# Patient Record
Sex: MALE | Race: WHITE | Employment: OTHER | URBAN - METROPOLITAN AREA
[De-identification: names, ages, dates, MRNs, and addresses within clinical notes are randomized per-mention and may not be internally consistent; named-entity substitution may affect disease eponyms.]

---

## 2017-03-22 ENCOUNTER — OFFICE VISIT (OUTPATIENT)
Dept: URGENT CARE | Facility: URGENT CARE | Age: 42
End: 2017-03-22

## 2017-03-22 VITALS
BODY MASS INDEX: 34.22 KG/M2 | WEIGHT: 301.8 LBS | RESPIRATION RATE: 16 BRPM | OXYGEN SATURATION: 100 % | HEART RATE: 84 BPM | DIASTOLIC BLOOD PRESSURE: 80 MMHG | TEMPERATURE: 98.6 F | SYSTOLIC BLOOD PRESSURE: 128 MMHG

## 2017-03-22 DIAGNOSIS — R07.0 THROAT PAIN: ICD-10-CM

## 2017-03-22 DIAGNOSIS — J02.0 STREPTOCOCCAL PHARYNGITIS: Primary | ICD-10-CM

## 2017-03-22 LAB
DEPRECATED S PYO AG THROAT QL EIA: ABNORMAL
MICRO REPORT STATUS: ABNORMAL
SPECIMEN SOURCE: ABNORMAL

## 2017-03-22 PROCEDURE — 99202 OFFICE O/P NEW SF 15 MIN: CPT | Performed by: NURSE PRACTITIONER

## 2017-03-22 PROCEDURE — 87880 STREP A ASSAY W/OPTIC: CPT | Performed by: NURSE PRACTITIONER

## 2017-03-22 RX ORDER — PENICILLIN V POTASSIUM 500 MG/1
500 TABLET, FILM COATED ORAL 2 TIMES DAILY
Qty: 20 TABLET | Refills: 0 | Status: SHIPPED | OUTPATIENT
Start: 2017-03-22 | End: 2017-04-01

## 2017-03-22 NOTE — MR AVS SNAPSHOT
After Visit Summary   3/22/2017    Saroj Spann    MRN: [id number]           Patient Information     Date Of Birth          1975        Visit Information        Provider Department      3/22/2017 7:30 PM Luis Arnold APRN [facility]RG[facility]        Today's Diagnoses     Streptococcal pharyngitis    -  1    Throat pain          Care Instructions      Pharyngitis: Strep (Confirmed)     You have had a positive test for strep throat. Strep throat is a contagious illness. It is spread by coughing, kissing or by touching others after touching your mouth or nose. Symptoms include throat pain which is worse with swallowing, aching all over, headache and fever. It is treated with antibiotic medication. This should help you start to feel better within 1-2 days.  Home care    Rest at home. Drink plenty of fluids to avoid dehydration.    No work or school for the first 2 days of taking the antibiotics. After this time, you will not be contagious. You can then return to school or work if you are feeling better.     The antibiotic medication must be taken for the full 10 days, even if you feel better. This is very important to ensure the infection is treated. It is also important to prevent drug-resistent organisms from developing. If you were given an antibiotic shot, no more antibiotics are needed.    You may use acetaminophen (Tylenol) or ibuprofen (Motrin, Advil) to control pain or fever, unless another medicine was prescribed for this. (NOTE: If you have chronic liver or kidney disease or ever had a stomach ulcer or GI bleeding, talk with your doctor before using these medicines.)    Throat lozenges or sprays (such as Chloraseptic) help reduce pain. Gargling with warm salt water will also reduce throat pain. Dissolve 1/2 teaspoon of salt in 1 glass of warm water. This may be useful just before meals.     Soft foods are okay. Avoid salty or spicy foods.  Follow-up care  Follow  up with your healthcare provider or our staff if you are not improving over the next week.  When to seek medical advice  Call your healthcare provider right away if any of these occur:    Fever as directed by your doctor     New or worsening ear pain, sinus pain, or headache    Painful lumps in the back of neck    Stiff neck    Lymph nodes are getting larger or becoming soft in the middle    Inability to swallow liquids, excessive drooling, or inability to open mouth wide due to throat pain    Signs of dehydration (very dark urine or no urine, sunken eyes, dizziness)    Trouble breathing or noisy breathing    Muffled voice    New rash    [id number] [employer]. [address] [place] [address] 39759. All rights reserved. This information is not intended as a substitute for professional medical care. Always follow your healthcare professional's instructions.              Follow-ups after your visit        Who to contact     If you have questions or need follow up information about today's clinic visit or your schedule please contact [facility] directly at [phone].  Normal or non-critical lab and imaging results will be communicated to you by MainOnehart, letter or phone within 4 business days after the clinic has received the results. If you do not hear from us within 7 days, please contact the clinic through CrossReadert or phone. If you have a critical or abnormal lab result, we will notify you by phone as soon as possible.  Submit refill requests through Impraise or call your pharmacy and they will forward the refill request to us. Please allow 3 business days for your refill to be completed.          Additional Information About Your Visit        Impraise Information     Impraise gives you secure access to your electronic health record. If you see a primary care provider, you can also send messages to your care team and make appointments. If you have questions, please call your  primary care clinic.  If you do not have a primary care provider, please call [phone] and they will assist you.        Care EveryWhere ID     This is your Care EveryWhere ID. This could be used by other organizations to access your [facility] medical records  [id number]        Your Vitals Were     Pulse Temperature Respirations Pulse Oximetry BMI (Body Mass Index)       84 98.6  F (37  C) (Oral) 16 100% 34.22 kg/m2        Blood Pressure from Last 3 Encounters:   03/22/17 128/80   11/15/12 114/73   10/11/12 116/68    Weight from Last 3 Encounters:   03/22/17 (!) 301 lb 12.8 oz (136.9 kg)   11/15/12 272 lb (123.4 kg)   10/11/12 280 lb (127 kg)              We Performed the Following     Strep, Rapid Screen          Today's Medication Changes          These changes are accurate as of: 3/22/17  8:41 PM.  If you have any questions, ask your nurse or doctor.               Start taking these medicines.        Dose/Directions    penicillin V potassium 500 MG tablet   Commonly known as:  VEETID   Used for:  Streptococcal pharyngitis   Started by:  Luis Arnold APRN CNP        Dose:  500 mg   Take 1 tablet (500 mg) by mouth 2 times daily for 10 days   Quantity:  20 tablet   Refills:  0            Where to get your medicines      These medications were sent to [facility] [address] [place] [address] AT [address] [address]    Hours:  24-hours Phone:  [phone]     penicillin V potassium 500 MG tablet                Primary Care Provider Office Phone # Fax #    Rigoberto Chambers [phone]       [facility] [address] [place] [address]        Thank you!     Thank you for choosing [facility]  for your care. Our goal is always to provide you with excellent care. Hearing back from our patients is one way we can continue to improve our services. Please take a few  minutes to complete the written survey that you may receive in the mail after your visit with us. Thank you!             Your Updated Medication List - Protect others around you: Learn how to safely use, store and throw away your medicines at www.disposemymeds.org.          This list is accurate as of: 3/22/17  8:41 PM.  Always use your most recent med list.                   Brand Name Dispense Instructions for use    imiquimod 5 % cream    ALDARA    12 packet    Apply a small sized amount to warts or molluscum three times weekly at bedtime.   Wash off after 8 hours.   May use for up to 16 weeks.       penicillin V potassium 500 MG tablet    VEETID    20 tablet    Take 1 tablet (500 mg) by mouth 2 times daily for 10 days       triamcinolone 0.1 % cream    KENALOG    30 g    Apply to affected area of hands and feet up to 2 times daily as needed.

## 2017-03-23 NOTE — PROGRESS NOTES
Chief Complaint   Patient presents with     Urgent Care     Pharyngitis       SUBJECTIVE:  Saroj Spann is a 41 year old male who presents with 2 days of a sore throat. There has been strep exposure. No fevers. No chills. However sore throat appears to be worsening. No coughing. No congestion.        OBJECTIVE:  /80  Pulse 84  Temp 98.6  F (37  C) (Oral)  Resp 16  Wt (!) 301 lb 12.8 oz (136.9 kg)  SpO2 100%  BMI 34.22 kg/m2   middle-aged male in no acute distress. Bilateral eyes were non injected with pupils equal and reactive to light. Fundi was normal. Bilateral TM were clear. Bilateral external ear canals were clear. Oral mucosa was moist without any erythema or exudate. No significant cervical adenopathy. Lung sounds were clear to ascultation throughout without any wheezing or rales. No rhonchi. Heart was of regular rhythm and rate. No murmur. Abdomen was soft and nontender, with bowel sounds active throughout. No organomegaly    Results for orders placed or performed in visit on 03/22/17   Strep, Rapid Screen   Result Value Ref Range    Specimen Description Throat     Rapid Strep A Screen (A)      POSITIVE: Group A Streptococcal antigen detected by immunoassay.    Micro Report Status FINAL 03/22/2017          ASSESSMENT/PLAN:    (J02.0) Streptococcal pharyngitis  (primary encounter diagnosis)  Comment: Strep pharyngitis treated as below. Follow-up with any ongoing concerns.  Plan: penicillin V potassium (VEETID) 500 MG tablet          VALARIE Marr CNP

## 2017-03-23 NOTE — PATIENT INSTRUCTIONS
Pharyngitis: Strep (Confirmed)     You have had a positive test for strep throat. Strep throat is a contagious illness. It is spread by coughing, kissing or by touching others after touching your mouth or nose. Symptoms include throat pain which is worse with swallowing, aching all over, headache and fever. It is treated with antibiotic medication. This should help you start to feel better within 1-2 days.  Home care    Rest at home. Drink plenty of fluids to avoid dehydration.    No work or school for the first 2 days of taking the antibiotics. After this time, you will not be contagious. You can then return to school or work if you are feeling better.     The antibiotic medication must be taken for the full 10 days, even if you feel better. This is very important to ensure the infection is treated. It is also important to prevent drug-resistent organisms from developing. If you were given an antibiotic shot, no more antibiotics are needed.    You may use acetaminophen (Tylenol) or ibuprofen (Motrin, Advil) to control pain or fever, unless another medicine was prescribed for this. (NOTE: If you have chronic liver or kidney disease or ever had a stomach ulcer or GI bleeding, talk with your doctor before using these medicines.)    Throat lozenges or sprays (such as Chloraseptic) help reduce pain. Gargling with warm salt water will also reduce throat pain. Dissolve 1/2 teaspoon of salt in 1 glass of warm water. This may be useful just before meals.     Soft foods are okay. Avoid salty or spicy foods.  Follow-up care  Follow up with your healthcare provider or our staff if you are not improving over the next week.  When to seek medical advice  Call your healthcare provider right away if any of these occur:    Fever as directed by your doctor     New or worsening ear pain, sinus pain, or headache    Painful lumps in the back of neck    Stiff neck    Lymph nodes are getting larger or becoming soft in the  middle    Inability to swallow liquids, excessive drooling, or inability to open mouth wide due to throat pain    Signs of dehydration (very dark urine or no urine, sunken eyes, dizziness)    Trouble breathing or noisy breathing    Muffled voice    New rash    [phone] [employer] [address] [place] [address]39. All rights reserved. This information is not intended as a substitute for professional medical care. Always follow your healthcare professional's instructions.

## 2017-03-23 NOTE — NURSING NOTE
"Chief Complaint   Patient presents with     Urgent Care     Pharyngitis       Initial /80  Pulse 84  Temp 98.6  F (37  C) (Oral)  Resp 16  Wt (!) 301 lb 12.8 oz (136.9 kg)  SpO2 100%  BMI 34.22 kg/m2 Estimated body mass index is 34.22 kg/(m^2) as calculated from the following:    Height as of 11/15/12: 6' 6.75\" (2 m).    Weight as of this encounter: 301 lb 12.8 oz (136.9 kg).  Medication Reconciliation: complete       Anu Car  CMA      "

## 2020-02-23 ENCOUNTER — HEALTH MAINTENANCE LETTER (OUTPATIENT)
Age: 45
End: 2020-02-23

## 2020-08-27 ENCOUNTER — VIRTUAL VISIT (OUTPATIENT)
Dept: UROLOGY | Facility: CLINIC | Age: 45
End: 2020-08-27
Payer: COMMERCIAL

## 2020-08-27 VITALS — WEIGHT: 300 LBS | BODY MASS INDEX: 34.71 KG/M2 | HEIGHT: 78 IN

## 2020-08-27 DIAGNOSIS — Z30.2 ENCOUNTER FOR STERILIZATION: Primary | ICD-10-CM

## 2020-08-27 PROCEDURE — 99202 OFFICE O/P NEW SF 15 MIN: CPT | Mod: 95 | Performed by: UROLOGY

## 2020-08-27 ASSESSMENT — PAIN SCALES - GENERAL: PAINLEVEL: NO PAIN (0)

## 2020-08-27 ASSESSMENT — MIFFLIN-ST. JEOR: SCORE: 2394.92

## 2020-08-27 NOTE — NURSING NOTE
Chief Complaint   Patient presents with     Consult     Vasectomy consultation       Marli Gilliland, EMT

## 2020-08-27 NOTE — PROGRESS NOTES
"Saroj Spann is a 45 year old male who is being evaluated via a billable telephone visit.      The patient has been notified of following:     \"This telephone visit will be conducted via a call between you and your physician/provider. We have found that certain health care needs can be provided without the need for a physical exam.  This service lets us provide the care you need with a short phone conversation.  If a prescription is necessary we can send it directly to your pharmacy.  If lab work is needed we can place an order for that and you can then stop by our lab to have the test done at a later time.    Telephone visits are billed at different rates depending on your insurance coverage. During this emergency period, for some insurers they may be billed the same as an in-person visit.  Please reach out to your insurance provider with any questions.    If during the course of the call the physician/provider feels a telephone visit is not appropriate, you will not be charged for this service.\"    Patient has given verbal consent for Telephone visit?  Yes    What phone number would you like to be contacted at? [phone]    Notes: Mr. Spann is a 45-year-old male who is  and has teenage children.  He has a new girlfriend but she is not interested in having children in the future.  The procedure, the alternatives, risks and follow-up of bilateral vasectomy were discussed in my own words.  The patient is on no medications  No known allergies  Exam: Alert and oriented, no respiratory signs or admitted symptoms  Treated for genital warts several years ago-no recurrence  Assessment: Elective sterilization  Plan: Bilateral vasectomy in the office under local anesthesia, semen analysis 3 months following procedure.  Avoid aspirin and Advil 1 week prior to procedure    How would you like to obtain your AVS? My chart    Phone call duration: 10 minutes    WM Aure MD      "

## 2020-08-27 NOTE — LETTER
"8/27/2020       RE: Saroj Spann  [id number]  [place] [facility] [address]     Dear Colleague,    Thank you for referring your patient, Saroj Spann, to the [facility] [place] [facility]. Please see a copy of my visit note below.    Saroj Spann is a 45 year old male who is being evaluated via a billable telephone visit.      The patient has been notified of following:     \"This telephone visit will be conducted via a call between you and your physician/provider. We have found that certain health care needs can be provided without the need for a physical exam.  This service lets us provide the care you need with a short phone conversation.  If a prescription is necessary we can send it directly to your pharmacy.  If lab work is needed we can place an order for that and you can then stop by our lab to have the test done at a later time.    Telephone visits are billed at different rates depending on your insurance coverage. During this emergency period, for some insurers they may be billed the same as an in-person visit.  Please reach out to your insurance provider with any questions.    If during the course of the call the physician/provider feels a telephone visit is not appropriate, you will not be charged for this service.\"    Patient has given verbal consent for Telephone visit?  Yes    What phone number would you like to be contacted at? [phone]    Notes: Mr. Spann is a 45-year-old male who is  and has teenage children.  He has a new girlfriend but she is not interested in having children in the future.  The procedure, the alternatives, risks and follow-up of bilateral vasectomy were discussed in my own words.  The patient is on no medications  No known allergies  Exam: Alert and oriented, no respiratory signs or admitted symptoms  Treated for genital warts several years ago-no " recurrence  Assessment: Elective sterilization  Plan: Bilateral vasectomy in the office under local anesthesia, semen analysis 3 months following procedure.  Avoid aspirin and Advil 1 week prior to procedure    How would you like to obtain your AVS? My chart    Phone call duration: 10 minutes    WM Aure MD

## 2020-12-06 ENCOUNTER — HEALTH MAINTENANCE LETTER (OUTPATIENT)
Age: 45
End: 2020-12-06

## 2021-04-11 ENCOUNTER — HEALTH MAINTENANCE LETTER (OUTPATIENT)
Age: 46
End: 2021-04-11

## 2021-06-28 ENCOUNTER — HOSPITAL ENCOUNTER (EMERGENCY)
Facility: CLINIC | Age: 46
Discharge: HOME | End: 2021-06-28
Attending: EMERGENCY MEDICINE | Admitting: EMERGENCY MEDICINE
Payer: COMMERCIAL

## 2021-06-28 ENCOUNTER — APPOINTMENT (OUTPATIENT)
Dept: GENERAL RADIOLOGY | Facility: CLINIC | Age: 46
End: 2021-06-28
Attending: EMERGENCY MEDICINE
Payer: COMMERCIAL

## 2021-06-28 VITALS
RESPIRATION RATE: 18 BRPM | TEMPERATURE: 96.5 F | HEART RATE: 73 BPM | SYSTOLIC BLOOD PRESSURE: 155 MMHG | OXYGEN SATURATION: 97 % | DIASTOLIC BLOOD PRESSURE: 93 MMHG

## 2021-06-28 DIAGNOSIS — M54.6 RIGHT-SIDED THORACIC BACK PAIN, UNSPECIFIED CHRONICITY: ICD-10-CM

## 2021-06-28 PROCEDURE — 72072 X-RAY EXAM THORAC SPINE 3VWS: CPT

## 2021-06-28 PROCEDURE — 99283 EMERGENCY DEPT VISIT LOW MDM: CPT

## 2021-06-28 RX ORDER — METHYLPREDNISOLONE 4 MG
4 TABLET, DOSE PACK ORAL SEE ADMIN INSTRUCTIONS
Qty: 21 EACH | Refills: 0 | Status: SHIPPED | OUTPATIENT
Start: 2021-06-28

## 2021-06-28 RX ORDER — CYCLOBENZAPRINE HCL 10 MG
5-10 TABLET ORAL 3 TIMES DAILY PRN
Qty: 20 TABLET | Refills: 0 | Status: SHIPPED | OUTPATIENT
Start: 2021-06-28

## 2021-06-28 ASSESSMENT — ENCOUNTER SYMPTOMS
BACK PAIN: 1
HEMATURIA: 0
FREQUENCY: 0
DIARRHEA: 0
DYSURIA: 0
CONSTIPATION: 0
BLOOD IN STOOL: 0

## 2021-06-28 NOTE — ED TRIAGE NOTES
"A&O x4.  ABC's intact.      Pt arrives with c/o mid right back pain that started couple wks ago and getting worse. Pt reports \"tweeked\" back a gym.   "

## 2021-06-28 NOTE — ED PROVIDER NOTES
History   Chief Complaint:  Mid right sided back pain    HPI   Saroj Spann is a 45 year old male who presents with mid-right sided back pain. A few weeks ago, the patient was doing sit ups with his legs raised when he felt a sudden sharp muscle pain in his mid-right back. He states his pain has gotten much worse over the past couple of weeks. He notes that he tweaks his back often when working out but the pain never lasts beyond a week. He has been taking ibuprofen which has not helped his current episode of pain. His pain is somewhat relieved while laying down and relaxing. His pain is exacerbated with certain movements.  Pain seems to radiate from the back around to the abdomen.  He denies history of diabetes. He also denies numbness in rectum and urinary/bowel symptoms or numbness/weakness in the legs. No dysuria or hematuria.     Review of Systems   Gastrointestinal: Negative for blood in stool, constipation and diarrhea.   Genitourinary: Negative for dysuria, frequency, hematuria and urgency.   Musculoskeletal: Positive for back pain (mid-right).   All other systems reviewed and are negative.    Allergies:  The patient has no known allergies.     Medications:  The patient is not currently taking any prescribed medications.    Past Medical History:    Biceps tendonitis  Coracoid impingement  Orchitis    Family History:    Diabetes  Cancer  ASD    Social History:  The patient presents by himself.    Physical Exam     Patient Vitals for the past 24 hrs:   BP Temp Temp src Pulse Resp SpO2   06/28/21 1033 (!) 155/93 96.5  F (35.8  C) Temporal 73 18 97 %       Physical Exam  VS: Reviewed per above  HENT: normal speech  EYES: sclera anicteric  CV: Rate as noted, regular rhythm.   RESP: Effort normal. Breath sounds are normal bilaterally.  GI: no tenderness/rebound/guarding, not distended.  NEURO: GCS 15, cranial nerves II through XII are intact, 5 out of 5 strength in all 4 extremities, sensation is intact  light touch in all 4 extremities  MSK: No deformity of the extremities.  Right paraspinal thoracic musculature is somewhat tender and more swollen compared to the left.  No overlying redness or warmth or rashes.  SKIN: Warm and dry      Emergency Department Course   Imaging:    X-ray Thoracic spine, 3 views:  Satisfactory height/alignment. Degenerative thoracic   spondylosis with interspace narrowing and osteophytes. No acute   fracture or post traumatic malalignment is identified. No evidence for   displaced rib fractures. Difficulty visualization cervicothoracic   junction on swimmer's view due to overlap of adjacent contiguous soft   tissue structures. Otherwise negative.  Result per radiology.     Emergency Department Course:    Reviewed:    I reviewed the patient's nursing notes, vitals, past medical records, Care Everywhere.     Assessments:    1045: I performed an exam of the patient and obtained history, as documented above.      1130: I rechecked the patient and updated them on findings. They are amenable to discharge.     Disposition:  The patient was discharged to home.       Impression & Plan   Medical Decision Making:  Patient presents to the ER for evaluation of right-sided mid back pain that onset after doing sit ups a few weeks ago.  Pain is intermittent and worse with movements.  Nature of pain is not consistent with renal colic or biliary colic or aortic dissection/aneurysmal rupture.  I was worried for occult thoracic radiculopathy given radiation of pain.  Thoracic x-ray does not show evidence of fracture or other obvious pathologic lesions.  No signs of cauda equina or conus medullaris.  Discussed that patient may benefit from MRI imaging if not improving in the coming weeks.  At this time, plan for steroid taper and Flexeril for muscle relaxation in addition to ibuprofen and Tylenol and over-the-counter lidocaine patches.  Return precautions discussed prior to discharge.    Diagnosis:     ICD-10-CM    1. Right-sided thoracic back pain, unspecified chronicity  M54.6        Discharge Medications:  New Prescriptions    CYCLOBENZAPRINE (FLEXERIL) 10 MG TABLET    Take 0.5-1 tablets (5-10 mg) by mouth 3 times daily as needed for muscle spasms    METHYLPREDNISOLONE (MEDROL DOSEPAK) 4 MG TABLET THERAPY PACK    Take 1 tablet (4 mg) by mouth See Admin Instructions       Scribe Disclosure:  Olga PATEL, am serving as a scribe at 10:45 AM on 6/28/2021 to document services personally performed by Dirk Oseguera MD based on my observations and the provider's statements to me.         Dirk Oseguera MD  06/28/21 [id number]

## 2021-09-25 ENCOUNTER — HEALTH MAINTENANCE LETTER (OUTPATIENT)
Age: 46
End: 2021-09-25

## 2022-05-07 ENCOUNTER — HEALTH MAINTENANCE LETTER (OUTPATIENT)
Age: 47
End: 2022-05-07

## 2023-01-07 ENCOUNTER — HEALTH MAINTENANCE LETTER (OUTPATIENT)
Age: 48
End: 2023-01-07

## 2023-06-02 ENCOUNTER — HEALTH MAINTENANCE LETTER (OUTPATIENT)
Age: 48
End: 2023-06-02

## 2024-06-29 ENCOUNTER — HEALTH MAINTENANCE LETTER (OUTPATIENT)
Age: 49
End: 2024-06-29